# Patient Record
(demographics unavailable — no encounter records)

---

## 2025-04-25 NOTE — ASSESSMENT
[FreeTextEntry1] : Ms. Castillo Cerna is a 76-year-old woman who presents for abdominal discomfort with fried foods and lactose ingestion, likely due to dietary indiscretion. Patient also due for screening colonoscopy. Chronic dyspepsia symptoms for which patient has been on PPI for many years, has underlying osteopenia, will plan EGD at same procedure to evaluate break through symptoms and abdominal concerns. If no evidence of esophagitis, low threshold to transition to H2 block over PPI. Risks, benefits and alternatives discussed in detail. Patient medically optimized and agreeable to proceed with planned procedure.

## 2025-04-25 NOTE — PHYSICAL EXAM
[Alert] : alert [Normal Voice/Communication] : normal voice/communication [Healthy Appearing] : healthy appearing [No Acute Distress] : no acute distress [Sclera] : the sclera and conjunctiva were normal [Hearing Threshold Finger Rub Not [place]] : hearing was normal [Oropharynx] : the oropharynx was normal [Normal Appearance] : the appearance of the neck was normal [No Respiratory Distress] : no respiratory distress [No Acc Muscle Use] : no accessory muscle use [Respiration, Rhythm And Depth] : normal respiratory rhythm and effort [Heart Rate And Rhythm] : heart rate was normal and rhythm regular [Abdomen Tenderness] : non-tender [No Masses] : no abdominal mass palpated [Abdomen Soft] : soft [] : no hepatosplenomegaly [Abnormal Walk] : normal gait [Oriented To Time, Place, And Person] : oriented to person, place, and time

## 2025-04-25 NOTE — PHYSICAL EXAM
[Alert] : alert [Normal Voice/Communication] : normal voice/communication [Healthy Appearing] : healthy appearing [No Acute Distress] : no acute distress [Sclera] : the sclera and conjunctiva were normal [Hearing Threshold Finger Rub Not [place]] : hearing was normal [Oropharynx] : the oropharynx was normal [Normal Appearance] : the appearance of the neck was normal [No Respiratory Distress] : no respiratory distress [No Acc Muscle Use] : no accessory muscle use [Respiration, Rhythm And Depth] : normal respiratory rhythm and effort [Heart Rate And Rhythm] : heart rate was normal and rhythm regular [Abdomen Tenderness] : non-tender [No Masses] : no abdominal mass palpated [] : no hepatosplenomegaly [Abdomen Soft] : soft [Abnormal Walk] : normal gait [Oriented To Time, Place, And Person] : oriented to person, place, and time

## 2025-04-25 NOTE — PLAN
[TextEntry] : Trigger food avoidance (Lactose / Fatty foods)  Scheduled for average risk screening colonoscopy / upper endoscopy with biopsies  Bowel preparation ordered in office today, instructions explained in detail. Additional recommendations to follow post procedure.

## 2025-04-25 NOTE — HISTORY OF PRESENT ILLNESS
[FreeTextEntry1] : Ms. Castillo Cerna is a 76-year-old woman who presents for abdominal discomfort with fried foods and lactose ingestion. Normal bowel movements - no diarrhea. No constipation. Patient denies any alarming features. Patient denies any known family history of gastrointestinal malignancy. Not currently on any anti-coagulation or GLP-1 agonist medications. Patient endorses feeling at their baseline level of health without acute concerns other than aforementioned post prandial concerns. Upper endoscopy - Dr. Dodd (2018) which was unremarkable. No recent Colonoscopy reports available. In office with her daughter this afternoon. Note: patient also s/p cholecystectomy.

## 2025-05-22 NOTE — HISTORY OF PRESENT ILLNESS
[FreeTextEntry1] : Ms. Castillo Cerna is a 76-year-old woman who presented for abdominal discomfort with fried foods and lactose ingestion, likely due to dietary indiscretion. Patient also due for screening colonoscopy. Chronic dyspepsia symptoms for which patient has been on PPI for many years, has underlying osteopenia, will plan EGD at same procedure to evaluate break through symptoms and abdominal concerns. If no evidence of esophagitis, low threshold to transition to H2 block over PPI.

## 2025-05-22 NOTE — PHYSICAL EXAM
[Alert] : alert [Normal Voice/Communication] : normal voice/communication [Healthy Appearing] : healthy appearing [No Acute Distress] : no acute distress [Sclera] : the sclera and conjunctiva were normal [Hearing Threshold Finger Rub Not [place]] : hearing was normal [Oropharynx] : the oropharynx was normal [Normal Appearance] : the appearance of the neck was normal [No Respiratory Distress] : no respiratory distress [No Acc Muscle Use] : no accessory muscle use [Respiration, Rhythm And Depth] : normal respiratory rhythm and effort [Heart Rate And Rhythm] : heart rate was normal and rhythm regular [Abdomen Tenderness] : non-tender [No Masses] : no abdominal mass palpated [Abdomen Soft] : soft [] : no hepatosplenomegaly [Abnormal Walk] : normal gait [Oriented To Time, Place, And Person] : oriented to person, place, and time
